# Patient Record
Sex: FEMALE | Race: WHITE | NOT HISPANIC OR LATINO | URBAN - METROPOLITAN AREA
[De-identification: names, ages, dates, MRNs, and addresses within clinical notes are randomized per-mention and may not be internally consistent; named-entity substitution may affect disease eponyms.]

---

## 2017-08-18 ENCOUNTER — ACNE/ROSACEA (OUTPATIENT)
Dept: URBAN - METROPOLITAN AREA CLINIC 32 | Facility: CLINIC | Age: 47
Setting detail: DERMATOLOGY
End: 2017-08-18

## 2017-08-18 PROCEDURE — 99213 OFFICE O/P EST LOW 20 MIN: CPT

## 2017-08-18 RX ORDER — CLINDAMYCIN PHOS/BENZOYL PEROX 1.2(1)%-5%
GEL (GRAM) TOPICAL
Qty: 45 | Refills: 6
Start: 2017-08-18

## 2017-08-18 RX ORDER — TRIAMCINOLONE ACETONIDE 1 MG/G
CREAM TOPICAL
Qty: 80 | Refills: 0
Start: 2017-08-18

## 2017-08-18 RX ORDER — TAZAROTENE 0.05 MG/G
CREAM CUTANEOUS
Qty: 30 | Refills: 8
Start: 2017-08-18

## 2023-12-22 ENCOUNTER — APPOINTMENT (RX ONLY)
Dept: URBAN - METROPOLITAN AREA CLINIC 141 | Facility: CLINIC | Age: 53
Setting detail: DERMATOLOGY
End: 2023-12-22

## 2023-12-22 DIAGNOSIS — L30.9 DERMATITIS, UNSPECIFIED: ICD-10-CM | Status: INADEQUATELY CONTROLLED

## 2023-12-22 DIAGNOSIS — D49.2 NEOPLASM OF UNSPECIFIED BEHAVIOR OF BONE, SOFT TISSUE, AND SKIN: ICD-10-CM

## 2023-12-22 PROCEDURE — 99204 OFFICE O/P NEW MOD 45 MIN: CPT | Mod: 25

## 2023-12-22 PROCEDURE — ? PRESCRIPTION

## 2023-12-22 PROCEDURE — 11102 TANGNTL BX SKIN SINGLE LES: CPT

## 2023-12-22 PROCEDURE — ? COUNSELING

## 2023-12-22 PROCEDURE — ? BIOPSY BY SHAVE METHOD

## 2023-12-22 RX ORDER — DAPSONE 50 MG/G
GEL TOPICAL
Qty: 60 | Refills: 0 | Status: ERX | COMMUNITY
Start: 2023-12-22

## 2023-12-22 RX ORDER — DOXYCYCLINE HYCLATE 50 MG/1
TABLET, FILM COATED ORAL
Qty: 28 | Refills: 0 | Status: ERX | COMMUNITY
Start: 2023-12-22

## 2023-12-22 RX ADMIN — DOXYCYCLINE HYCLATE 1: 50 TABLET, FILM COATED ORAL at 00:00

## 2023-12-22 RX ADMIN — DAPSONE 1: 50 GEL TOPICAL at 00:00

## 2023-12-22 ASSESSMENT — LOCATION SIMPLE DESCRIPTION DERM
LOCATION SIMPLE: RIGHT PRETIBIAL REGION
LOCATION SIMPLE: LEFT PRETIBIAL REGION

## 2023-12-22 ASSESSMENT — LOCATION DETAILED DESCRIPTION DERM
LOCATION DETAILED: LEFT PROXIMAL PRETIBIAL REGION
LOCATION DETAILED: RIGHT PROXIMAL PRETIBIAL REGION
LOCATION DETAILED: RIGHT DISTAL PRETIBIAL REGION

## 2023-12-22 ASSESSMENT — LOCATION ZONE DERM: LOCATION ZONE: LEG

## 2023-12-22 NOTE — PROCEDURE: BIOPSY BY SHAVE METHOD
Detail Level: Detailed
Depth Of Biopsy: dermis
Was A Bandage Applied: Yes
Size Of Lesion In Cm: 0
Biopsy Type: H and E
Biopsy Method: Dermablade
Anesthesia Type: 1% lidocaine with epinephrine
Anesthesia Volume In Cc: 0.5
Hemostasis: Drysol
Wound Care: Petrolatum
Dressing: bandage
Destruction After The Procedure: No
Type Of Destruction Used: Curettage
Curettage Text: The wound bed was treated with curettage after the biopsy was performed.
Cryotherapy Text: The wound bed was treated with cryotherapy after the biopsy was performed.
Electrodesiccation Text: The wound bed was treated with electrodesiccation after the biopsy was performed.
Electrodesiccation And Curettage Text: The wound bed was treated with electrodesiccation and curettage after the biopsy was performed.
Silver Nitrate Text: The wound bed was treated with silver nitrate after the biopsy was performed.
Lab: -[id number]
Consent: Written consent was obtained and risks were reviewed including but not limited to scarring, infection, bleeding, scabbing, incomplete removal, nerve damage and allergy to anesthesia.
Post-Care Instructions: I reviewed with the patient in detail post-care instructions. Patient is to keep the biopsy site dry overnight, and then apply bacitracin twice daily until healed. Patient may apply hydrogen peroxide soaks to remove any crusting.
Notification Instructions: Patient will be notified of biopsy results. However, patient instructed to call the office if not contacted within 2 weeks.
Billing Type: Third-Party Bill
Information: Selecting Yes will display possible errors in your note based on the variables you have selected. This validation is only offered as a suggestion for you. PLEASE NOTE THAT THE VALIDATION TEXT WILL BE REMOVED WHEN YOU FINALIZE YOUR NOTE. IF YOU WANT TO FAX A PRELIMINARY NOTE YOU WILL NEED TO TOGGLE THIS TO 'NO' IF YOU DO NOT WANT IT IN YOUR FAXED NOTE.

## 2024-01-12 ENCOUNTER — APPOINTMENT (RX ONLY)
Dept: URBAN - METROPOLITAN AREA CLINIC 141 | Facility: CLINIC | Age: 54
Setting detail: DERMATOLOGY
End: 2024-01-12

## 2024-01-12 DIAGNOSIS — L20.9 ATOPIC DERMATITIS, UNSPECIFIED: ICD-10-CM

## 2024-01-12 PROCEDURE — 99214 OFFICE O/P EST MOD 30 MIN: CPT

## 2024-01-12 PROCEDURE — ? PRESCRIPTION

## 2024-01-12 PROCEDURE — ? ADDITIONAL NOTES

## 2024-01-12 PROCEDURE — ? PRESCRIPTION MEDICATION MANAGEMENT

## 2024-01-12 PROCEDURE — ? COUNSELING

## 2024-01-12 RX ORDER — PREDNISONE 5 MG/1
TABLET ORAL
Qty: 42 | Refills: 0 | Status: ERX | COMMUNITY
Start: 2024-01-12

## 2024-01-12 RX ORDER — CLOBETASOL PROPIONATE 0.5 MG/G
CREAM TOPICAL
Qty: 60 | Refills: 2 | Status: ERX | COMMUNITY
Start: 2024-01-12

## 2024-01-12 RX ADMIN — PREDNISONE: 5 TABLET ORAL at 00:00

## 2024-01-12 RX ADMIN — CLOBETASOL PROPIONATE: 0.5 CREAM TOPICAL at 00:00

## 2024-01-12 ASSESSMENT — LOCATION ZONE DERM: LOCATION ZONE: LEG

## 2024-01-12 ASSESSMENT — LOCATION SIMPLE DESCRIPTION DERM
LOCATION SIMPLE: LEFT PRETIBIAL REGION
LOCATION SIMPLE: RIGHT PRETIBIAL REGION

## 2024-01-12 ASSESSMENT — SEVERITY ASSESSMENT 2020: SEVERITY 2020: MILD

## 2024-01-12 ASSESSMENT — BSA ECZEMA: % BODY COVERED IN ECZEMA: 10

## 2024-01-12 ASSESSMENT — ITCH NUMERIC RATING SCALE: HOW SEVERE IS YOUR ITCHING?: 4

## 2024-01-12 ASSESSMENT — LOCATION DETAILED DESCRIPTION DERM
LOCATION DETAILED: RIGHT PROXIMAL PRETIBIAL REGION
LOCATION DETAILED: LEFT PROXIMAL PRETIBIAL REGION

## 2024-01-12 NOTE — PROCEDURE: PRESCRIPTION MEDICATION MANAGEMENT
"Preventing Cerebrovascular Disease  Arteries are blood vessels that carry blood that contains oxygen from the heart to all parts of the body. Cerebrovascular disease affects arteries that supply the brain. Any condition that blocks or disrupts blood flow to the brain can cause cerebrovascular disease. Brain cells that lose blood supply start to die within minutes (stroke). Stroke is the main danger of cerebrovascular disease.  Atherosclerosis and high blood pressure are common causes of cerebrovascular disease. Atherosclerosis is narrowing and hardening of an artery that results when fat, cholesterol, calcium, or other substances (plaque) build up inside an artery. Plaque reduces blood flow through the artery. High blood pressure increases the risk of bleeding inside the brain.  Making diet and lifestyle changes to prevent atherosclerosis and high blood pressure lowers your risk of cerebrovascular disease.  What nutrition changes can be made?  · Eat more fruits, vegetables, and whole grains.  · Reduce how much saturated fat you eat. To do this, eat less red meat and fewer full-fat dairy products.  · Eat healthy proteins instead of red meat. Healthy proteins include:  ? Fish. Eat fish that contains heart-healthy omega-3 fatty acids, twice a week. Examples include salmon, albacore tuna, mackerel, and herring.  ? Chicken.  ? Nuts.  ? Low-fat or nonfat yogurt.  · Avoid processed meats, like dominguez and lunchmeat.  · Avoid foods that contain:  ? A lot of sugar, such as sweets and drinks with added sugar.  ? A lot of salt (sodium). Avoid adding extra salt to your food, as told by your health care provider.  ? Trans fats, such as margarine and baked goods. Trans fats may be listed as \"partially hydrogenated oils” on food labels.  · Check food labels to see how much sodium, sugar, and trans fats are in foods.  · Use vegetable oils that contain low amounts of saturated fat, such as olive oil or canola oil.  What lifestyle "
changes can be made?  · Drink alcohol in moderation. This means no more than 1 drink a day for nonpregnant women and 2 drinks a day for men. One drink equals 12 oz of beer, 5 oz of wine, or 1½ oz of hard liquor.  · If you are overweight, ask your health care provider to recommend a weight-loss plan for you. Losing 5-10 lb (2.2-4.5 kg) can reduce your risk of diabetes, atherosclerosis, and high blood pressure.  · Exercise for 30?60 minutes on most days, or as much as told by your health care provider.  ? Do moderate-intensity exercise, such as brisk walking, bicycling, and water aerobics. Ask your health care provider which activities are safe for you.  · Do not use any products that contain nicotine or tobacco, such as cigarettes and e-cigarettes. If you need help quitting, ask your health care provider.  Why are these changes important?  Making these changes lowers your risk of many diseases that can cause cerebrovascular disease and stroke. Stroke is a leading cause of death and disability. Making these changes also improves your overall health and quality of life.  What can I do to lower my risk?  The following factors make you more likely to develop cerebrovascular disease:  · Being overweight.  · Smoking.  · Being physically inactive.  · Eating a high-fat diet.  · Having certain health conditions, such as:  ? Diabetes.  ? High blood pressure.  ? Heart disease.  ? Atherosclerosis.  ? High cholesterol.  ? Sickle cell disease.    Talk with your health care provider about your risk for cerebrovascular disease. Work with your health care provider to control diseases that you have that may contribute to cerebrovascular disease. Your health care provider may prescribe medicines to help prevent major causes of cerebrovascular disease.  Where to find more information  Learn more about preventing cerebrovascular disease from:  · National Heart, [employer], [employer] [place]: 
www.nhlbi.nih.gov/health/health-topics/topics/stroke  · Centers for Disease Control and Prevention: cdc.gov/stroke/about.htm    Summary  · Cerebrovascular disease can lead to a stroke.  · Atherosclerosis and high blood pressure are major causes of cerebrovascular disease.  · Making diet and lifestyle changes can reduce your risk of cerebrovascular disease.  · Work with your health care provider to get your risk factors under control to reduce your risk of cerebrovascular disease.  This information is not intended to replace advice given to you by your health care provider. Make sure you discuss any questions you have with your health care provider.  Document Released: 01/01/2017 Document Revised: 07/07/2017 Document Reviewed: 01/01/2017  [employer] Interactive Patient Education © 2019 [employer]    Stroke Prevention  Some medical conditions and lifestyle choices can lead to a higher risk for a stroke. You can help to prevent a stroke by making nutrition, lifestyle, and other changes.  What nutrition changes can be made?  · Eat healthy foods.  ? Choose foods that are high in fiber. These include:  § Fresh fruits.  § Fresh vegetables.  § Whole grains.  ? Eat at least 5 or more servings of fruits and vegetables each day. Try to fill half of your plate at each meal with fruits and vegetables.  ? Choose lean protein foods. These include:  § Lowfat (lean) cuts of meat.  § Chicken without skin.  § Fish.  § Tofu.  § Beans.  § Nuts.  ? Eat low-fat dairy products.  ? Avoid foods that:  § Are high in salt (sodium).  § Have saturated fat.  § Have trans fat.  § Have cholesterol.  § Are processed.  § Are premade.  · Follow eating guidelines as told by your doctor. These may include:  ? Reducing how many calories you eat and drink each day.  ? Limiting how much salt you eat or drink each day to 1,500 milligrams (mg).  ? Using only healthy fats for cooking. These include:  § Olive oil.  § Canola oil.  § Sunflower oil.  ? Counting 
how many carbohydrates you eat and drink each day.  What lifestyle changes can be made?  · Try to stay at a healthy weight. Talk to your doctor about what a good weight is for you.  · Get at least 30 minutes of moderate physical activity at least 5 days a week. This can include:  ? Fast walking.  ? Biking.  ? Swimming.  · Do not use any products that have nicotine or tobacco. This includes cigarettes and e-cigarettes. If you need help quitting, ask your doctor. Avoid being around tobacco smoke in general.  · Limit how much alcohol you drink to no more than 1 drink a day for nonpregnant women and 2 drinks a day for men. One drink equals 12 oz of beer, 5 oz of wine, or 1½ oz of hard liquor.  · Do not use drugs.  · Avoid taking birth control pills. Talk to your doctor about the risks of taking birth control pills if:  ? You are over 35 years old.  ? You smoke.  ? You get migraines.  ? You have had a blood clot.  What other changes can be made?  · Manage your cholesterol.  ? It is important to eat a healthy diet.  ? If your cholesterol cannot be managed through your diet, you may also need to take medicines. Take medicines as told by your doctor.  · Manage your diabetes.  ? It is important to eat a healthy diet and to exercise regularly.  ? If your blood sugar cannot be managed through diet and exercise, you may need to take medicines. Take medicines as told by your doctor.  · Control your high blood pressure (hypertension).  ? Try to keep your blood pressure below 130/80. This can help lower your risk of stroke.  ? It is important to eat a healthy diet and to exercise regularly.  ? If your blood pressure cannot be managed through diet and exercise, you may need to take medicines. Take medicines as told by your doctor.  ? Ask your doctor if you should check your blood pressure at home.  ? Have your blood pressure checked every year. Do this even if your blood pressure is normal.  · Talk to your doctor about getting 
"checked for a sleep disorder. Signs of this can include:  ? Snoring a lot.  ? Feeling very tired.  · Take over-the-counter and prescription medicines only as told by your doctor. These may include aspirin or blood thinners (antiplatelets or anticoagulants).  · Make sure that any other medical conditions you have are managed.  Where to find more information  · American Stroke Association: www.strokeassociation.org  · National Stroke Association: www.stroke.org  Get help right away if:  · You have any symptoms of stroke. \"BE FAST\" is an easy way to remember the main warning signs:  ? B - Balance. Signs are dizziness, sudden trouble walking, or loss of balance.  ? E - Eyes. Signs are trouble seeing or a sudden change in how you see.  ? F - Face. Signs are sudden weakness or loss of feeling of the face, or the face or eyelid drooping on one side.  ? A - Arms. Signs are weakness or loss of feeling in an arm. This happens suddenly and usually on one side of the body.  ? S - Speech. Signs are sudden trouble speaking, slurred speech, or trouble understanding what people say.  ? T - Time. Time to call emergency services. Write down what time symptoms started.  · You have other signs of stroke, such as:  ? A sudden, very bad headache with no known cause.  ? Feeling sick to your stomach (nausea).  ? Throwing up (vomiting).  ? Jerky movements you cannot control (seizure).  These symptoms may represent a serious problem that is an emergency. Do not wait to see if the symptoms will go away. Get medical help right away. Call your local emergency services (911 in the [place] Do not drive yourself to the hospital.  Summary  · You can prevent a stroke by eating healthy, exercising, not smoking, drinking less alcohol, and treating other health problems, such as diabetes, high blood pressure, or high cholesterol.  · Do not use any products that contain nicotine or tobacco, such as cigarettes and e-cigarettes.  · Get help right away if "
Plan: Follow up in 1 month
"you have any signs or symptoms of a stroke.  This information is not intended to replace advice given to you by your health care provider. Make sure you discuss any questions you have with your health care provider.  Document Released: 06/18/2013 Document Revised: 03/21/2018 Document Reviewed: 03/21/2018  [employer] Interactive Patient Education © 2019 [employer]    Transient Ischemic Attack  A transient ischemic attack (TIA) is a \"warning stroke\" that causes stroke-like symptoms that then go away quickly. The symptoms of a TIA come on suddenly, and they last less than 24 hours. Unlike a stroke, a TIA does not cause permanent damage to the brain. It is important to know the symptoms of a TIA and what to do. Seek medical care right away, even if your symptoms go away.  Having a TIA is a sign that you are at higher risk for a permanent stroke. Lifestyle changes and medical treatments can help prevent a stroke.  What are the causes?  This condition is caused by a temporary blockage in an artery in the head or neck. The blockage does not allow the brain to get the blood supply it needs and can cause various symptoms. The blockage can be caused by:  · Fatty buildup in an artery in the head or neck (atherosclerosis).  · A blood clot.  · Tearing of an artery (dissection).  · Inflammation of an artery (vasculitis).    Sometimes the cause is not known.  What increases the risk?  Certain factors may make you more likely to develop this condition. Some of these factors are things that you can change, such as:  · Obesity.  · Using products that contain nicotine or tobacco, such as cigarettes and e-cigarettes.  · Taking oral birth control, especially if you also use tobacco.  · Lack of physical inactivity.  · Excessive use of alcohol.  · Use of drugs, especially cocaine and methamphetamine.    Other risk factors include:  · High blood pressure (hypertension).  · High cholesterol.  · Diabetes mellitus.  · Heart disease (coronary "
Detail Level: Simple
Initiate Treatment: clobetasol 0.05 % topical cream TP Sig: Bid x 2 weeks on 2 weeks off\\n\\nprednisone 5 mg tablet PO Sig: 3 pills everyday for 1 week,2 pills everyday for 1 week, 1 pill everyday for a week, 1/2 tab QD
artery disease).  · Atrial fibrillation.  · Being  or .  · Being over the age of 60.  · Being male.  · Family history of stroke.  · Previous history of blood clots, stroke, TIA, or heart attack.  · Sickle cell disease.  · Being a woman with a history of preeclampsia.  · Migraine headache.  · Sleep apnea.  · Chronic inflammatory diseases, such as rheumatoid arthritis or lupus.  · Blood clotting disorders (hypercoagulable state).    What are the signs or symptoms?  Symptoms of this condition are the same as those of a stroke, but they are temporary. The symptoms develop suddenly, and they go away quickly, usually within minutes to hours. Symptoms may include sudden:  · Weakness or numbness in your face, arm, or leg, especially on one side of your body.  · Trouble walking or difficulty moving your arms or legs.  · Trouble speaking, understanding speech, or both (aphasia).  · Vision changes in one or both eyes. These include double vision, blurred vision, or loss of vision.  · Dizziness.  · Confusion.  · Loss of balance or coordination.  · Nausea and vomiting.  · Severe headache with no known cause.    If possible, make note of the exact time that you last felt like your normal self and what time your symptoms started. Tell your health care provider.  How is this diagnosed?  This condition may be diagnosed based on:  · Your symptoms and medical history.  · A physical exam.  · Imaging tests, usually a CT or MRI scan of the brain.  · Blood tests.    You may also have other tests, including:  · Electrocardiogram (ECG).  · Echocardiogram.  · Carotid ultrasound.  · A scan of the brain circulation (CT angiogram or MRI angiogram).  · Continuous heart monitoring.    How is this treated?  The goal of treatment is to reduce the risk for a subsequent stroke. Treatment may include stroke prevention therapies such as:  · Changes to diet or lifestyle to decrease your risk. Lifestyle changes may include 
Render In Strict Bullet Format?: No
exercising and stopping smoking.  · Medicines to thin the blood (antiplatelets or anticoagulants).  · Blood pressure medicines.  · Medicines to reduce cholesterol.  · Treating other health conditions, such as diabetes or atrial fibrillation.    If testing shows that you have narrowing in the arteries to your brain, your health care provider may recommend a procedure, such as:  · Carotid endarterectomy. This is a surgery to remove the blockage from your artery.  · Carotid angioplasty and stenting. This is a procedure to open or widen an artery in the neck using a metal mesh tube (stent). The stent helps keep the artery open by supporting the artery walls.    Follow these instructions at home:  Medicines  · Take over-the-counter and prescription medicines only as told by your health care provider.  · If you were told to take a medicine to thin your blood, such as aspirin or an anticoagulant, take it exactly as told by your health care provider.  ? Taking too much blood-thinning medicine can cause bleeding.  ? If you do not take enough blood-thinning medicine, you will not have the protection that you need against a stroke and other problems.  Eating and drinking  · Eat 5 or more servings of fruits and vegetables each day.  · Follow instructions from your health care provider about diet. You may need to follow a certain nutrition plan to help manage risk factors for stroke, such as high blood pressure, high cholesterol, diabetes, or obesity. This may include:  ? Eating a low-fat, low-salt diet.  ? Including a lot of fiber in your diet.  ? Limiting the amount of carbohydrates and sugar in your diet.  · Limit alcohol intake to no more than 1 drink a day for nonpregnant women and 2 drinks a day for men. One drink equals 12 oz of beer, 5 oz of wine, or 1½ oz of hard liquor.  General instructions  · Maintain a healthy weight.  · Stay physically active. Try to get at least 30 minutes of exercise on most or all days.  · Find 
Discontinue Regimen: Dapsone
out if you have sleep apnea, and seek treatment if needed.  · Do not use any products that contain nicotine or tobacco, such as cigarettes and e-cigarettes. If you need help quitting, ask your health care provider.  · Do not abuse drugs.  · Keep all follow-up visits as told by your health care provider. This is important.  Where to find more information  · American Stroke Association: www.strokeassociation.org  · National Stroke Association: www.stroke.org  Get help right away if:  · You have chest pain or an irregular heartbeat.  · You have any symptoms of stroke. The acronym BEFAST is an easy way to remember the main warning signs of stroke.  ? B = Balance problems. Signs include dizziness, sudden trouble walking, or loss of balance.  ? E = Eye problems. This includes trouble seeing or a sudden change in vision.  ? F = Face changes. This includes sudden weakness or numbness of the face, or the face or eyelid drooping to one side.  ? A = Arm weakness or numbness. This happens suddenly and usually on one side of the body.  ? S = Speech problems. This includes trouble speaking or trouble understanding speech.  ? T = Time. Time to call 911 or seek emergency care. Do not wait to see if symptoms will go away. Make note of the time your symptoms started.  · Other signs of stroke may include:  ? A sudden, severe headache with no known cause.  ? Nausea or vomiting.  ? Seizure.  These symptoms may represent a serious problem that is an emergency. Do not wait to see if the symptoms will go away. Get medical help right away. Call your local emergency services (911 in the [place] Do not drive yourself to the hospital.  Summary  · A TIA happens when an artery in the head or neck is blocked, leading to stroke-like symptoms that then go away quickly. The blockage clears before there is any permanent brain damage. A TIA is a medical emergency and requires immediate medical attention.  · Symptoms of this condition are the same as 
those of a stroke, but they are temporary. The symptoms usually develop suddenly, and they go away quickly, usually within minutes to hours.  · Having a TIA means that you are at high risk of a stroke in the near future.  · Treatment may include medicines to thin the blood as well as medicines, diet changes, and lifestyle changes to manage conditions that increase the risk of another TIA or a stroke.  This information is not intended to replace advice given to you by your health care provider. Make sure you discuss any questions you have with your health care provider.  Document Released: 09/27/2006 Document Revised: 01/30/2018 Document Reviewed: 01/30/2018  [employer] Interactive Patient Education © 2019 [employer]  Paresthesia  Paresthesia is an abnormal burning or prickling sensation. This sensation is generally felt in the hands, arms, legs, or feet. However, it may occur in any part of the body. Usually, it is not painful. The feeling may be described as:  · Tingling or numbness.  · Pins and needles.  · Skin crawling.  · Buzzing.  · Limbs falling asleep.  · Itching.    Most people experience temporary (transient) paresthesia at some time in their lives. Paresthesia may occur when you breathe too quickly (hyperventilation). It can also occur without any apparent cause. Commonly, paresthesia occurs when pressure is placed on a nerve. The sensation quickly goes away after the pressure is removed. For some people, however, paresthesia is a long-lasting (chronic) condition that is caused by an underlying disorder. If you continue to have paresthesia, you may need further medical evaluation.  Follow these instructions at home:  Watch your condition for any changes. Taking the following actions may help to lessen any discomfort that you are feeling:  · Avoid drinking alcohol.  · Try acupuncture or massage to help relieve your symptoms.  · Keep all follow-up visits as directed by your health care provider. This is 
important.    Contact a health care provider if:  · You continue to have episodes of paresthesia.  · Your burning or prickling feeling gets worse when you walk.  · You have pain, cramps, or dizziness.  · You develop a rash.  Get help right away if:  · You feel weak.  · You have trouble walking or moving.  · You have problems with speech, understanding, or vision.  · You feel confused.  · You cannot control your bladder or bowel movements.  · You have numbness after an injury.  · You faint.  This information is not intended to replace advice given to you by your health care provider. Make sure you discuss any questions you have with your health care provider.  Document Released: 12/08/2003 Document Revised: 05/25/2017 Document Reviewed: 12/14/2015  [employer] Interactive Patient Education © 2019 [employer]

## 2024-01-12 NOTE — PROCEDURE: ADDITIONAL NOTES
Additional Notes: 1/12/24 patient is present today for follow up. Patient did doxycycline hyclate 50 mg tablet BID x2 weeks take with food and water and dapsone 5 % topical gel bid x 2 weeks then prn for flares to leg. Patient states medications did not help at all. Path results discussed in depth with patient.
Render Risk Assessment In Note?: yes
Detail Level: Simple

## 2024-02-16 ENCOUNTER — APPOINTMENT (RX ONLY)
Dept: URBAN - METROPOLITAN AREA CLINIC 141 | Facility: CLINIC | Age: 54
Setting detail: DERMATOLOGY
End: 2024-02-16

## 2024-02-16 DIAGNOSIS — L20.9 ATOPIC DERMATITIS, UNSPECIFIED: ICD-10-CM | Status: IMPROVED

## 2024-02-16 PROCEDURE — ? COUNSELING

## 2024-02-16 PROCEDURE — ? PRESCRIPTION MEDICATION MANAGEMENT

## 2024-02-16 PROCEDURE — ? ADDITIONAL NOTES

## 2024-02-16 PROCEDURE — 99214 OFFICE O/P EST MOD 30 MIN: CPT

## 2024-02-16 ASSESSMENT — LOCATION SIMPLE DESCRIPTION DERM
LOCATION SIMPLE: RIGHT PRETIBIAL REGION
LOCATION SIMPLE: LEFT PRETIBIAL REGION

## 2024-02-16 ASSESSMENT — LOCATION ZONE DERM: LOCATION ZONE: LEG

## 2024-02-16 NOTE — PROCEDURE: PRESCRIPTION MEDICATION MANAGEMENT
Continue Regimen: clobetasol 0.05 % topical cream TP Sig: Bid x 2 weeks on 2 weeks off
Plan: Follow up in 3-6 mo
Detail Level: Simple
Render In Strict Bullet Format?: No
Discontinue Regimen: Dapsone

## 2024-02-16 NOTE — PROCEDURE: ADDITIONAL NOTES
Additional Notes: 1/12/24 patient is present today for follow up. Patient did doxycycline hyclate 50 mg tablet BID x2 weeks take with food and water and dapsone 5 % topical gel bid x 2 weeks then prn for flares to leg. Patient states medications did not help at all. Path results discussed in depth with patient.\\n\\n2/16/24\\nPatient comes in today following up to atopic derm. on the legs . She states the itching has stopped and the prescribed meds helped . She is mostly clear upon examination today.\\nRecommend to be careful when shaving . \\nWhen traveling to cold areas take medication with her just in case of flare ups .
Render Risk Assessment In Note?: yes
Detail Level: Simple

## 2024-08-16 ENCOUNTER — APPOINTMENT (RX ONLY)
Dept: URBAN - METROPOLITAN AREA CLINIC 141 | Facility: CLINIC | Age: 54
Setting detail: DERMATOLOGY
End: 2024-08-16

## 2024-08-16 DIAGNOSIS — L20.9 ATOPIC DERMATITIS, UNSPECIFIED: ICD-10-CM | Status: WELL CONTROLLED

## 2024-08-16 PROCEDURE — ? ADDITIONAL NOTES

## 2024-08-16 PROCEDURE — 99214 OFFICE O/P EST MOD 30 MIN: CPT

## 2024-08-16 PROCEDURE — ? COUNSELING

## 2024-08-16 PROCEDURE — ? PRESCRIPTION MEDICATION MANAGEMENT

## 2024-08-16 ASSESSMENT — LOCATION SIMPLE DESCRIPTION DERM
LOCATION SIMPLE: RIGHT PRETIBIAL REGION
LOCATION SIMPLE: LEFT PRETIBIAL REGION

## 2024-08-16 ASSESSMENT — LOCATION ZONE DERM: LOCATION ZONE: LEG

## 2024-08-16 NOTE — PROCEDURE: PRESCRIPTION MEDICATION MANAGEMENT
Continue Regimen: clobetasol 0.05 % topical cream Bid x 2 weeks on 2 weeks off PRN flare ups.
Plan: Recommend moisturizing skin daily . Prednisone in reserve.
Detail Level: Simple
Render In Strict Bullet Format?: No

## 2024-08-16 NOTE — PROCEDURE: ADDITIONAL NOTES
Take ibuprofen and Flexeril for the pain, and muscle spasms.  Use ice and heat as appropriate.  Follow-up with her primary care physician if symptoms worsen rather than improve.  Turn to the ER at any time for any new or worsening conditions.  Back Pain (Acute Or Chronic)    Back pain is one of the most common problems The good news is that most people feel better in 1 to 2 weeks, and most of the rest in 1 to 2 months. Most people can remain active.  Symptoms  People experience and describe pain differently; not everyone is the same.  · The pain can be sharp, stabbing, shooting, aching, cramping or burning.  · Movement, standing, bending, lifting, sitting, or walking may worsen pain.  · It can be localized to one spot or area, or it can be more generalized.  · It can spread or radiate upwards, to the front, or go down your arms or legs (sciatica).  · It can cause muscle spasm.  Causes  Most of the time, \"mechanical problems\" with the muscles or spine cause the pain. Mechanical problems are usually caused by an injury to the muscles or ligaments. While illness can cause back pain, it is usually not caused by a serious illness.  · Physical activity such as sports, exercise, work, or normal activity  · Overexertion, lifting, pushing, pulling incorrectly or too aggressively  · Sudden twisting, bending, or stretching from an accident, or accidental movement  · Poor posture  · Stretching or moving wrong, without noticing pain at the time  · Poor coordination, lack of regular exercise (check with your doctor about this)  · Spinal disc disease or arthritis  · Stress  Pain can also be related to pregnancy, or illness like appendicitis, bladder or kidney infections, pelvic infections, and many other things.     Acute back pain usually gets better in 1 to 2 weeks. Back pain related to disk disease, arthritis in the spinal joints or spinal stenosis (narrowing of the spinal canal) can become chronic and last for months or 
Additional Notes: 1/12/24 patient is present today for follow up. Patient did doxycycline hyclate 50 mg tablet BID x2 weeks take with food and water and dapsone 5 % topical gel bid x 2 weeks then prn for flares to leg. Patient states medications did not help at all. Path results discussed in depth with patient.\\n\\n2/16/24\\nPatient comes in today following up to atopic derm. on the legs . She states the itching has stopped and the prescribed meds helped . She is mostly clear upon examination today.\\nRecommend to be careful when shaving . \\nWhen traveling to cold areas take medication with her just in case of flare ups .
years.  Unless you had a physical injury (for example, a car accident or fall) X-rays are usually not needed for the initial evaluation of back pain. If pain continues and does not respond to medical treatment, X-rays and other tests may be done at a later time.  Home care  Try these home care recommendations:  1. When in bed, try to find a position of comfort. A firm mattress is best. Try lying flat on your back with pillows under your knees. You can also try lying on your side with your knees bent up towards your chest and a pillow between your knees.  2. At first, do not try to stretch out the sore spots. If there is a strain, it is not like the good soreness you get after exercising without an injury. In this case, stretching may make it worse.  3. Avoid prolong sitting, long car rides, or travel. This puts more stress on the lower back than standing or walking.  4. During the first 24 to 72 hours after an acute injury or flare up of chronic back pain, apply an ice pack to the painful area for 20 minutes and then remove it for 20 minutes over a period of 60 to 90 minutes or several times a day. This will reduce swelling and pain. Wrap the ice pack in a think towel or plastic to protect your skin.  5. You can start with ice, then switch to heat. Heat (hot shower, hot bath, or heating pad) reduces swelling and pain and works well for muscle spasms. Heat can be applied to the painful area for 20 minutes then remove it for 20 minutes over a period of 60 to 90 minutes or several times a day. Do not sleep on a heating pad. It can lead to skin burns or tissue damage.  6. You can alternate ice and heat therapy. Talk with your doctor about the best treatment for your back pain.  7. Therapeutic massage can help relax the back muscles without stretching them.  8. Be aware of safe lifting methods and do not lift anything without stretching first.  Medicines  Talk to your doctor before using medications, especially if you 
Render Risk Assessment In Note?: yes
have other medical problems or are taking other medicines.  · You may use acetaminophen or ibuprofen to control pain, unless another pain medicine was prescribed. If you have chronic conditions like diabetes, liver or kidney disease, stomach ulcers, or gastrointestinal bleeding, or are taking blood thinners talk to your doctor before taking any medication.  · Be careful if you are given a prescription medicines, narcotics, or medication for muscle spasms. They can cause drowsiness, affect your coordination, reflexes, and judgement. Do not drive or operate heavy machinery.  Follow-up care  Follow up with your doctor or this facility if your symptoms do not start to improve after one week. Physical therapy may be needed.  If X-rays were taken, they will be reviewed by a radiologist. You will be notified of any new findings that may affect your care.  Call 911  Call emergency services if any of the following occur:  · Trouble breathing  · Confusion  · Very drowsy or trouble awakening  · Fainting or loss of consciousness  · Rapid or very slow heart rate  · Loss of bowel or bladder control  When to seek medical care  Get prompt medical attention if any of the following occur:  · Pain becomes worse or spreads to your legs  · Weakness or numbness in one or both legs  · Numbness in the groin or genital area  © 1324-1588 [employer]. [address] [place] [address], [place] [address]. All rights reserved. This information is not intended as a substitute for professional medical care. Always follow your healthcare professional's instructions.        
Detail Level: Simple